# Patient Record
Sex: MALE | Race: BLACK OR AFRICAN AMERICAN | NOT HISPANIC OR LATINO | Employment: OTHER | URBAN - METROPOLITAN AREA
[De-identification: names, ages, dates, MRNs, and addresses within clinical notes are randomized per-mention and may not be internally consistent; named-entity substitution may affect disease eponyms.]

---

## 2018-02-05 ENCOUNTER — HOSPITAL ENCOUNTER (EMERGENCY)
Facility: HOSPITAL | Age: 43
Discharge: HOME | End: 2018-02-05
Attending: EMERGENCY MEDICINE
Payer: MEDICAID

## 2018-02-05 VITALS
WEIGHT: 140 LBS | RESPIRATION RATE: 18 BRPM | OXYGEN SATURATION: 96 % | SYSTOLIC BLOOD PRESSURE: 132 MMHG | HEART RATE: 88 BPM | HEIGHT: 72 IN | DIASTOLIC BLOOD PRESSURE: 72 MMHG | TEMPERATURE: 98 F | BODY MASS INDEX: 18.96 KG/M2

## 2018-02-05 DIAGNOSIS — R06.00 DYSPNEA AND RESPIRATORY ABNORMALITIES: ICD-10-CM

## 2018-02-05 DIAGNOSIS — J20.9 BRONCHITIS WITH BRONCHOSPASM: Primary | ICD-10-CM

## 2018-02-05 DIAGNOSIS — R06.89 DYSPNEA AND RESPIRATORY ABNORMALITIES: ICD-10-CM

## 2018-02-05 LAB
BASOPHILS NFR BLD: 0 %
DIFFERENTIAL METHOD: ABNORMAL
EOSINOPHIL NFR BLD: 0 %
ERYTHROCYTE [DISTWIDTH] IN BLOOD BY AUTOMATED COUNT: 14.5 %
HCT VFR BLD AUTO: 43 %
HGB BLD-MCNC: 15.4 G/DL
LYMPHOCYTES NFR BLD: 11 %
MCH RBC QN AUTO: 32.1 PG
MCHC RBC AUTO-ENTMCNC: 35.8 G/DL
MCV RBC AUTO: 90 FL
MONOCYTES NFR BLD: 6 %
NEUTROPHILS NFR BLD: 80 %
NEUTS BAND NFR BLD MANUAL: 3 %
PLATELET # BLD AUTO: 256 K/UL
PMV BLD AUTO: 10.2 FL
RBC # BLD AUTO: 4.8 M/UL
WBC # BLD AUTO: 13.88 K/UL

## 2018-02-05 PROCEDURE — 63600175 PHARM REV CODE 636 W HCPCS: Performed by: EMERGENCY MEDICINE

## 2018-02-05 PROCEDURE — 25000242 PHARM REV CODE 250 ALT 637 W/ HCPCS: Performed by: EMERGENCY MEDICINE

## 2018-02-05 PROCEDURE — 94640 AIRWAY INHALATION TREATMENT: CPT

## 2018-02-05 PROCEDURE — 99284 EMERGENCY DEPT VISIT MOD MDM: CPT | Mod: 25

## 2018-02-05 PROCEDURE — 85027 COMPLETE CBC AUTOMATED: CPT

## 2018-02-05 PROCEDURE — 85007 BL SMEAR W/DIFF WBC COUNT: CPT

## 2018-02-05 PROCEDURE — 94761 N-INVAS EAR/PLS OXIMETRY MLT: CPT

## 2018-02-05 RX ORDER — PREDNISONE 20 MG/1
40 TABLET ORAL
Status: COMPLETED | OUTPATIENT
Start: 2018-02-05 | End: 2018-02-05

## 2018-02-05 RX ORDER — AZITHROMYCIN 250 MG/1
250 TABLET, FILM COATED ORAL DAILY
Qty: 6 TABLET | Refills: 0 | Status: SHIPPED | OUTPATIENT
Start: 2018-02-05

## 2018-02-05 RX ORDER — IPRATROPIUM BROMIDE AND ALBUTEROL SULFATE 2.5; .5 MG/3ML; MG/3ML
3 SOLUTION RESPIRATORY (INHALATION)
Status: COMPLETED | OUTPATIENT
Start: 2018-02-05 | End: 2018-02-05

## 2018-02-05 RX ADMIN — IPRATROPIUM BROMIDE AND ALBUTEROL SULFATE 3 ML: .5; 3 SOLUTION RESPIRATORY (INHALATION) at 03:02

## 2018-02-05 RX ADMIN — IPRATROPIUM BROMIDE AND ALBUTEROL SULFATE 3 ML: .5; 3 SOLUTION RESPIRATORY (INHALATION) at 04:02

## 2018-02-05 RX ADMIN — PREDNISONE 40 MG: 20 TABLET ORAL at 03:02

## 2018-02-05 NOTE — ED PROVIDER NOTES
Encounter Date: 2/5/2018    SCRIBE #1 NOTE: I, Mercy Bower, am scribing for, and in the presence of, Kamaljit Sim MD. Other sections scribed: HPI/ROS, physical exam.       History     Chief Complaint   Patient presents with    Fatigue     Pt reports ongoing weakness following the flu last week.      CC: Fatigue    Pt is a 42 y.o. Male smoker (1 ppd) w/ hx of hand surgery presenting to the ED c/o persistent, chronic SOB, weakness, and fatigue x 7 days (since the evening of 1/29/18). Pt was diagnosed with the flu on Tuesday morning (1/30/18) and was given a steroid injection and a 5-day course of Tamiflu, which he completed. Pt c/o decreased appetite since 1/29/18, and states he has not eaten anything since Monday (1/29/18), but has drank water and Gatorade. Pt c/o cough since 1/29/18 that is worse with lying flat. Pt states he has been using his wife's albuterol pump with significant relief. Pt c/o diarrhea that occurs within 5-10 minutes of oral intake. Pt reports several episodes of emesis, fever, and diaphoresis earlier last week, but not currently. Pt states he has not smoked since 1/29/18.     Pt denies nausea or rash. Pt reports no further symptoms.      The history is provided by the patient.     Review of patient's allergies indicates:   Allergen Reactions    Nsaids (non-steroidal anti-inflammatory drug)      Severe gastritis       Past Medical History:   Diagnosis Date    Asthma      Past Surgical History:   Procedure Laterality Date    HAND SURGERY  2012     History reviewed. No pertinent family history.  Social History   Substance Use Topics    Smoking status: Current Every Day Smoker     Packs/day: 1.00     Types: Cigarettes    Smokeless tobacco: Never Used    Alcohol use No     Review of Systems   Constitutional: Positive for appetite change (+) decrease and fatigue. Negative for diaphoresis and fever.   Respiratory: Positive for cough and shortness of breath.    Gastrointestinal: Positive for  diarrhea. Negative for nausea.   Skin: Negative for rash.   Neurological: Positive for weakness.       Physical Exam     Initial Vitals [02/05/18 1334]   BP Pulse Resp Temp SpO2   137/73 90 18 98.7 °F (37.1 °C) (!) 94 %      MAP       94.33         Physical Exam    Nursing note and vitals reviewed.  Constitutional: Vital signs are normal. He appears well-developed and well-nourished. He is active.  Non-toxic appearance. No distress.   HENT:   Head: Normocephalic and atraumatic.   Eyes: EOM are normal.   Neck: Trachea normal. Neck supple.   Cardiovascular: Normal rate and regular rhythm. Exam reveals gallop and S4.    Pulmonary/Chest: No respiratory distress. He has wheezes in the right lower field and the left lower field. He has rales in the right lower field.   Abdominal: Soft. Normal appearance and bowel sounds are normal. He exhibits no distension. There is no tenderness. There is no CVA tenderness.   Musculoskeletal: Normal range of motion. He exhibits no edema.        Thoracic back: He exhibits no tenderness and no bony tenderness.        Lumbar back: He exhibits no tenderness and no bony tenderness.   Neurological: He is alert.   Skin: Skin is warm, dry and intact. No rash noted.   Psychiatric: He has a normal mood and affect.         ED Course   Procedures   Imaging Results          X-Ray Chest PA And Lateral (Final result)  Result time 02/05/18 16:22:46    Final result by Todd Mccray Jr., MD (02/05/18 16:22:46)                 Impression:     Abnormal study with diffuse predominately reticulonodular pattern in the lower lung fields.  Correlation with clinical findings would be necessary.      Electronically signed by: TODD MCCRAY MD  Date:     02/05/18  Time:    16:22              Narrative:    Chest 2 views compared to December 2012.  Heart size and pulmonary vessels are normal.  There is some patchy increase in the interstitial and alveolar markings predominantly in the lower lung fields.  Developing  infiltrate not excluded.  Unfortunately the imaging findings are nonspecific.                              Labs Reviewed   CBC W/ AUTO DIFFERENTIAL - Abnormal; Notable for the following:        Result Value    WBC 13.88 (*)     MCH 32.1 (*)     Gran% 80.0 (*)     Lymph% 11.0 (*)     All other components within normal limits                        Scribe Attestation:   Scribe #1: I performed the above scribed service and the documentation accurately describes the services I performed. I attest to the accuracy of the note.    Attending Attestation:           Physician Attestation for Scribe:  Physician Attestation Statement for Scribe #1: I, Kamaljit Sim MD, reviewed documentation, as scribed by Mercy Bower in my presence, and it is both accurate and complete.                  uneventful coarse in the Ed.  Completed duo-neb w/o difficulty: ssx's reolving.  Ambulated in the ED after therapy: no O2-sat drop.  Pt painfree.  Steroid bolus well tolerated.  Feels well enough to go home--requests to do so.      Clinical Impression:   The primary encounter diagnosis was Bronchitis with bronchospasm. A diagnosis of Dyspnea and respiratory abnormalities was also pertinent to this visit.                           Kamaljit Sim MD  02/22/18 [id number]

## 2018-02-05 NOTE — ED TRIAGE NOTES
Pt. Reported that on Monday was dx with the flu, however pt. Stated he is unable eat bc he has no appetite. Tired and fatigue, SOB, and coughing ( non-productive). Denies any pain and fever.

## 2018-02-06 NOTE — DISCHARGE INSTRUCTIONS
Plenty of fluids.    Take the antibiotics as prescribed.    Use the inhaler as prescribed for the next 3 days; thereafter, as needed.    Follow-up with your primary care physician or an urgent care clinic for failure to improve over the next 48-72 hours.

## 2021-09-10 ENCOUNTER — HOSPITAL ENCOUNTER (EMERGENCY)
Facility: HOSPITAL | Age: 46
Discharge: HOME | End: 2021-09-10
Attending: EMERGENCY MEDICINE
Payer: MEDICAID

## 2021-09-10 VITALS
BODY MASS INDEX: 19.88 KG/M2 | HEART RATE: 58 BPM | DIASTOLIC BLOOD PRESSURE: 77 MMHG | RESPIRATION RATE: 18 BRPM | SYSTOLIC BLOOD PRESSURE: 132 MMHG | HEIGHT: 71 IN | TEMPERATURE: 98 F | WEIGHT: 142 LBS | OXYGEN SATURATION: 98 %

## 2021-09-10 DIAGNOSIS — M54.12 CERVICAL RADICULOPATHY: Primary | ICD-10-CM

## 2021-09-10 DIAGNOSIS — M54.2 NECK PAIN: ICD-10-CM

## 2021-09-10 DIAGNOSIS — M25.519 SHOULDER PAIN: ICD-10-CM

## 2021-09-10 PROCEDURE — 25000003 PHARM REV CODE 250: Performed by: PHYSICIAN ASSISTANT

## 2021-09-10 PROCEDURE — 63600175 PHARM REV CODE 636 W HCPCS: Performed by: PHYSICIAN ASSISTANT

## 2021-09-10 PROCEDURE — 99284 EMERGENCY DEPT VISIT MOD MDM: CPT | Mod: 25

## 2021-09-10 PROCEDURE — 96372 THER/PROPH/DIAG INJ SC/IM: CPT

## 2021-09-10 RX ORDER — CYCLOBENZAPRINE HCL 10 MG
10 TABLET ORAL 3 TIMES DAILY PRN
Qty: 20 TABLET | Refills: 0 | Status: SHIPPED | OUTPATIENT
Start: 2021-09-10 | End: 2021-09-17

## 2021-09-10 RX ORDER — ACETAMINOPHEN 500 MG
500 TABLET ORAL EVERY 4 HOURS PRN
Qty: 20 TABLET | Refills: 0 | Status: SHIPPED | OUTPATIENT
Start: 2021-09-10 | End: 2021-09-15

## 2021-09-10 RX ORDER — GABAPENTIN 300 MG/1
1 TABLET ORAL NIGHTLY
Qty: 30 TABLET | Refills: 0 | Status: SHIPPED | OUTPATIENT
Start: 2021-09-10 | End: 2021-10-10

## 2021-09-10 RX ORDER — LIDOCAINE 50 MG/G
1 PATCH TOPICAL DAILY
Qty: 15 PATCH | Refills: 0 | Status: SHIPPED | OUTPATIENT
Start: 2021-09-10 | End: 2021-09-25

## 2021-09-10 RX ORDER — CYCLOBENZAPRINE HCL 10 MG
10 TABLET ORAL
Status: COMPLETED | OUTPATIENT
Start: 2021-09-10 | End: 2021-09-10

## 2021-09-10 RX ORDER — MORPHINE SULFATE 4 MG/ML
6 INJECTION, SOLUTION INTRAMUSCULAR; INTRAVENOUS
Status: COMPLETED | OUTPATIENT
Start: 2021-09-10 | End: 2021-09-10

## 2021-09-10 RX ADMIN — CYCLOBENZAPRINE 10 MG: 10 TABLET, FILM COATED ORAL at 11:09

## 2021-09-10 RX ADMIN — MORPHINE SULFATE 6 MG: 4 INJECTION INTRAVENOUS at 11:09

## 2021-09-15 ENCOUNTER — HOSPITAL ENCOUNTER (EMERGENCY)
Facility: HOSPITAL | Age: 46
Discharge: HOME | End: 2021-09-16
Attending: EMERGENCY MEDICINE
Payer: MEDICAID

## 2021-09-15 DIAGNOSIS — M54.12 CERVICAL RADICULOPATHY: Primary | ICD-10-CM

## 2021-09-15 DIAGNOSIS — M48.02 CERVICAL STENOSIS OF SPINAL CANAL: ICD-10-CM

## 2021-09-15 DIAGNOSIS — M47.812 CERVICAL SPONDYLOSIS: ICD-10-CM

## 2021-09-15 PROCEDURE — 96372 THER/PROPH/DIAG INJ SC/IM: CPT

## 2021-09-15 PROCEDURE — 99284 EMERGENCY DEPT VISIT MOD MDM: CPT | Mod: 25

## 2021-09-15 RX ORDER — OXYCODONE AND ACETAMINOPHEN 5; 325 MG/1; MG/1
1 TABLET ORAL
Status: COMPLETED | OUTPATIENT
Start: 2021-09-16 | End: 2021-09-16

## 2021-09-15 RX ORDER — DEXAMETHASONE SODIUM PHOSPHATE 4 MG/ML
12 INJECTION, SOLUTION INTRA-ARTICULAR; INTRALESIONAL; INTRAMUSCULAR; INTRAVENOUS; SOFT TISSUE
Status: COMPLETED | OUTPATIENT
Start: 2021-09-16 | End: 2021-09-16

## 2021-09-16 VITALS
HEIGHT: 71 IN | HEART RATE: 74 BPM | BODY MASS INDEX: 19.88 KG/M2 | DIASTOLIC BLOOD PRESSURE: 89 MMHG | RESPIRATION RATE: 18 BRPM | SYSTOLIC BLOOD PRESSURE: 133 MMHG | OXYGEN SATURATION: 98 % | WEIGHT: 142 LBS | TEMPERATURE: 99 F

## 2021-09-16 PROCEDURE — 63600175 PHARM REV CODE 636 W HCPCS: Performed by: PHYSICIAN ASSISTANT

## 2021-09-16 PROCEDURE — 25000003 PHARM REV CODE 250: Performed by: PHYSICIAN ASSISTANT

## 2021-09-16 RX ORDER — PREDNISONE 20 MG/1
40 TABLET ORAL DAILY
Qty: 10 TABLET | Refills: 0 | Status: SHIPPED | OUTPATIENT
Start: 2021-09-16 | End: 2021-09-21

## 2021-09-16 RX ORDER — OXYCODONE AND ACETAMINOPHEN 5; 325 MG/1; MG/1
1 TABLET ORAL EVERY 6 HOURS PRN
Qty: 12 TABLET | Refills: 0 | Status: SHIPPED | OUTPATIENT
Start: 2021-09-16

## 2021-09-16 RX ADMIN — DEXAMETHASONE SODIUM PHOSPHATE 12 MG: 4 INJECTION INTRA-ARTICULAR; INTRALESIONAL; INTRAMUSCULAR; INTRAVENOUS; SOFT TISSUE at 12:09

## 2021-09-16 RX ADMIN — OXYCODONE HYDROCHLORIDE AND ACETAMINOPHEN 1 TABLET: 5; 325 TABLET ORAL at 12:09
